# Patient Record
Sex: FEMALE | Race: WHITE | NOT HISPANIC OR LATINO | Employment: FULL TIME | URBAN - METROPOLITAN AREA
[De-identification: names, ages, dates, MRNs, and addresses within clinical notes are randomized per-mention and may not be internally consistent; named-entity substitution may affect disease eponyms.]

---

## 2023-12-15 ENCOUNTER — HOSPITAL ENCOUNTER (EMERGENCY)
Facility: HOSPITAL | Age: 58
Discharge: HOME | End: 2023-12-16
Attending: STUDENT IN AN ORGANIZED HEALTH CARE EDUCATION/TRAINING PROGRAM
Payer: COMMERCIAL

## 2023-12-15 ENCOUNTER — APPOINTMENT (OUTPATIENT)
Dept: RADIOLOGY | Facility: HOSPITAL | Age: 58
End: 2023-12-15
Payer: COMMERCIAL

## 2023-12-15 ENCOUNTER — APPOINTMENT (OUTPATIENT)
Dept: CARDIOLOGY | Facility: HOSPITAL | Age: 58
End: 2023-12-15
Payer: COMMERCIAL

## 2023-12-15 DIAGNOSIS — R00.2 HEART PALPITATIONS: ICD-10-CM

## 2023-12-15 DIAGNOSIS — I49.3 PVC (PREMATURE VENTRICULAR CONTRACTION): Primary | ICD-10-CM

## 2023-12-15 LAB
ALBUMIN SERPL BCP-MCNC: 4.4 G/DL (ref 3.4–5)
ALP SERPL-CCNC: 101 U/L (ref 33–110)
ALT SERPL W P-5'-P-CCNC: 21 U/L (ref 7–45)
ANION GAP SERPL CALC-SCNC: 12 MMOL/L (ref 10–20)
AST SERPL W P-5'-P-CCNC: 14 U/L (ref 9–39)
BASOPHILS # BLD AUTO: 0.04 X10*3/UL (ref 0–0.1)
BASOPHILS NFR BLD AUTO: 0.5 %
BILIRUB SERPL-MCNC: 0.2 MG/DL (ref 0–1.2)
BUN SERPL-MCNC: 18 MG/DL (ref 6–23)
CALCIUM SERPL-MCNC: 9.7 MG/DL (ref 8.6–10.3)
CARDIAC TROPONIN I PNL SERPL HS: 3 NG/L (ref 0–13)
CHLORIDE SERPL-SCNC: 102 MMOL/L (ref 98–107)
CO2 SERPL-SCNC: 31 MMOL/L (ref 21–32)
CREAT SERPL-MCNC: 0.8 MG/DL (ref 0.5–1.05)
EOSINOPHIL # BLD AUTO: 0.17 X10*3/UL (ref 0–0.7)
EOSINOPHIL NFR BLD AUTO: 2.2 %
ERYTHROCYTE [DISTWIDTH] IN BLOOD BY AUTOMATED COUNT: 11.9 % (ref 11.5–14.5)
GFR SERPL CREATININE-BSD FRML MDRD: 86 ML/MIN/1.73M*2
GLUCOSE SERPL-MCNC: 94 MG/DL (ref 74–99)
HCT VFR BLD AUTO: 44.7 % (ref 36–46)
HGB BLD-MCNC: 15.5 G/DL (ref 12–16)
IMM GRANULOCYTES # BLD AUTO: 0.03 X10*3/UL (ref 0–0.7)
IMM GRANULOCYTES NFR BLD AUTO: 0.4 % (ref 0–0.9)
LYMPHOCYTES # BLD AUTO: 2.29 X10*3/UL (ref 1.2–4.8)
LYMPHOCYTES NFR BLD AUTO: 29.6 %
MAGNESIUM SERPL-MCNC: 2.08 MG/DL (ref 1.6–2.4)
MCH RBC QN AUTO: 32.1 PG (ref 26–34)
MCHC RBC AUTO-ENTMCNC: 34.7 G/DL (ref 32–36)
MCV RBC AUTO: 93 FL (ref 80–100)
MONOCYTES # BLD AUTO: 0.74 X10*3/UL (ref 0.1–1)
MONOCYTES NFR BLD AUTO: 9.6 %
NEUTROPHILS # BLD AUTO: 4.46 X10*3/UL (ref 1.2–7.7)
NEUTROPHILS NFR BLD AUTO: 57.7 %
NRBC BLD-RTO: 0 /100 WBCS (ref 0–0)
PLATELET # BLD AUTO: 285 X10*3/UL (ref 150–450)
POTASSIUM SERPL-SCNC: 3.6 MMOL/L (ref 3.5–5.3)
PROT SERPL-MCNC: 7.9 G/DL (ref 6.4–8.2)
RBC # BLD AUTO: 4.83 X10*6/UL (ref 4–5.2)
SODIUM SERPL-SCNC: 141 MMOL/L (ref 136–145)
WBC # BLD AUTO: 7.7 X10*3/UL (ref 4.4–11.3)

## 2023-12-15 PROCEDURE — 84443 ASSAY THYROID STIM HORMONE: CPT | Performed by: STUDENT IN AN ORGANIZED HEALTH CARE EDUCATION/TRAINING PROGRAM

## 2023-12-15 PROCEDURE — 84484 ASSAY OF TROPONIN QUANT: CPT | Performed by: STUDENT IN AN ORGANIZED HEALTH CARE EDUCATION/TRAINING PROGRAM

## 2023-12-15 PROCEDURE — 71045 X-RAY EXAM CHEST 1 VIEW: CPT | Performed by: RADIOLOGY

## 2023-12-15 PROCEDURE — 85025 COMPLETE CBC W/AUTO DIFF WBC: CPT | Performed by: STUDENT IN AN ORGANIZED HEALTH CARE EDUCATION/TRAINING PROGRAM

## 2023-12-15 PROCEDURE — 80053 COMPREHEN METABOLIC PANEL: CPT | Performed by: STUDENT IN AN ORGANIZED HEALTH CARE EDUCATION/TRAINING PROGRAM

## 2023-12-15 PROCEDURE — 71045 X-RAY EXAM CHEST 1 VIEW: CPT | Mod: FY

## 2023-12-15 PROCEDURE — 84439 ASSAY OF FREE THYROXINE: CPT | Performed by: STUDENT IN AN ORGANIZED HEALTH CARE EDUCATION/TRAINING PROGRAM

## 2023-12-15 PROCEDURE — 83735 ASSAY OF MAGNESIUM: CPT | Performed by: STUDENT IN AN ORGANIZED HEALTH CARE EDUCATION/TRAINING PROGRAM

## 2023-12-15 PROCEDURE — 85379 FIBRIN DEGRADATION QUANT: CPT | Performed by: STUDENT IN AN ORGANIZED HEALTH CARE EDUCATION/TRAINING PROGRAM

## 2023-12-15 PROCEDURE — 99284 EMERGENCY DEPT VISIT MOD MDM: CPT | Performed by: STUDENT IN AN ORGANIZED HEALTH CARE EDUCATION/TRAINING PROGRAM

## 2023-12-15 PROCEDURE — 36415 COLL VENOUS BLD VENIPUNCTURE: CPT | Performed by: STUDENT IN AN ORGANIZED HEALTH CARE EDUCATION/TRAINING PROGRAM

## 2023-12-15 PROCEDURE — 93005 ELECTROCARDIOGRAM TRACING: CPT

## 2023-12-15 PROCEDURE — 99283 EMERGENCY DEPT VISIT LOW MDM: CPT | Mod: 25

## 2023-12-15 ASSESSMENT — COLUMBIA-SUICIDE SEVERITY RATING SCALE - C-SSRS
6. HAVE YOU EVER DONE ANYTHING, STARTED TO DO ANYTHING, OR PREPARED TO DO ANYTHING TO END YOUR LIFE?: NO
2. HAVE YOU ACTUALLY HAD ANY THOUGHTS OF KILLING YOURSELF?: NO
1. IN THE PAST MONTH, HAVE YOU WISHED YOU WERE DEAD OR WISHED YOU COULD GO TO SLEEP AND NOT WAKE UP?: NO

## 2023-12-15 ASSESSMENT — PAIN SCALES - GENERAL
PAINLEVEL_OUTOF10: 0 - NO PAIN
PAINLEVEL_OUTOF10: 0 - NO PAIN

## 2023-12-15 ASSESSMENT — LIFESTYLE VARIABLES
REASON UNABLE TO ASSESS: NO
HAVE PEOPLE ANNOYED YOU BY CRITICIZING YOUR DRINKING: NO
EVER HAD A DRINK FIRST THING IN THE MORNING TO STEADY YOUR NERVES TO GET RID OF A HANGOVER: NO
EVER FELT BAD OR GUILTY ABOUT YOUR DRINKING: NO
HAVE YOU EVER FELT YOU SHOULD CUT DOWN ON YOUR DRINKING: NO

## 2023-12-15 ASSESSMENT — PAIN - FUNCTIONAL ASSESSMENT: PAIN_FUNCTIONAL_ASSESSMENT: 0-10

## 2023-12-16 VITALS
RESPIRATION RATE: 16 BRPM | DIASTOLIC BLOOD PRESSURE: 76 MMHG | SYSTOLIC BLOOD PRESSURE: 144 MMHG | HEART RATE: 72 BPM | TEMPERATURE: 96.8 F | HEIGHT: 65 IN | OXYGEN SATURATION: 96 % | WEIGHT: 164.9 LBS | BODY MASS INDEX: 27.47 KG/M2

## 2023-12-16 LAB
CARDIAC TROPONIN I PNL SERPL HS: 4 NG/L (ref 0–13)
D DIMER PPP FEU-MCNC: <215 NG/ML FEU
T4 FREE SERPL-MCNC: 1.1 NG/DL (ref 0.61–1.12)
TSH SERPL-ACNC: 0.06 MIU/L (ref 0.44–3.98)

## 2023-12-16 PROCEDURE — 84484 ASSAY OF TROPONIN QUANT: CPT | Performed by: STUDENT IN AN ORGANIZED HEALTH CARE EDUCATION/TRAINING PROGRAM

## 2023-12-16 PROCEDURE — 36415 COLL VENOUS BLD VENIPUNCTURE: CPT | Performed by: STUDENT IN AN ORGANIZED HEALTH CARE EDUCATION/TRAINING PROGRAM

## 2023-12-16 NOTE — DISCHARGE INSTRUCTIONS
You are diagnosed with frequent PVCs please follow-up with the cardiologist as provided should you been experiencing chest pain shortness of breath syncope symptoms concerning to call 911 or return immediately.  Recommend adequate rest decrease in caffeine usage as well as alcohol.  Adequate hydration as well.

## 2023-12-16 NOTE — ED PROVIDER NOTES
EMERGENCY DEPARTMENT ENCOUNTER      Pt Name: Kitty De Leon  MRN: [id number]  Birthdate 1965  Date of evaluation: 12/15/2023  Provider: Wm Faria DO    CHIEF COMPLAINT       Chief Complaint   Patient presents with    Palpitations     57 Y/O FEMALE STATES SHE WAS OUT SHOPPING ALL DAY AND WHEN SHE GOT HOME SHE BEGAN TO FEEL HER HEART BEATING IRREGULARLY. PATIENT STATES THAT SHE HAS NOTICED THIS FEELING FOR SEVERAL MONTHS BUT NOT FOR A SUSTAINED PERIOD LIKE TONIGHT        HISTORY OF PRESENT ILLNESS    Kitty De Leon is a 58 y.o. female who presents to the emergency department with Her self for intermittent palpitations for the past few months becoming more frequent this evening.  She states that she was out shopping with her friend had 1 cup of coffee earlier this morning and 1 alcoholic beverage throughout the day noticed them becoming more frequent.  She states this was anxiety provoking and they became even more frequent.  Only medical history is hypothyroidism.  She does occasionally vape tobacco only.  Denies any further associated symptoms at this time.  Denies any recent travel by the cars or planes.  No history of coagulopathies or history of blood clots.          Nursing Notes were reviewed.    REVIEW OF SYSTEMS     CONSTITUTIONAL: Denies fever, sweats, chills.   NEURO: Denies difficulty walking, numbness, weakness, tingling, headache.   HEENT: Denies sore throat, rhinorrhea, changes in vision.   CARDIO: Endorses palpitations.  Denies chest pain.  PULM: Denies shortness of breath, cough.   GI: Denies abdominal pain, nausea, vomiting, diarrhea, constipation, melena, hematochezia.  : Denies painful urination, frequency, hematuria.   MSK: Denies recent trauma.   SKIN: Denies rash, lesions.   ENDOCRINE: Denies unexpected weight-loss.   HEME: Denies bleeding disorder.     PAST MEDICAL HISTORY     Past Medical History:   Diagnosis Date    Personal history of other diseases of the circulatory system      History of hypertension    Personal history of other endocrine, nutritional and metabolic disease     History of hypothyroidism       SURGICAL HISTORY       Past Surgical History:   Procedure Laterality Date    DILATION AND CURETTAGE OF UTERUS  08/31/2015    Dilation And Curettage    LAPAROSCOPY DIAGNOSTIC / BIOPSY / ASPIRATION / LYSIS  08/31/2015    Laparoscopy (Diagnostic)       ALLERGIES     Patient has no known allergies.    FAMILY HISTORY     No family history on file.     SOCIAL HISTORY       Social History     Socioeconomic History    Marital status: Single     Spouse name: None    Number of children: None    Years of education: None    Highest education level: None   Occupational History    None   Tobacco Use    Smoking status: None    Smokeless tobacco: None   Substance and Sexual Activity    Alcohol use: None    Drug use: None    Sexual activity: None   Other Topics Concern    None   Social History Narrative    None     Social Determinants of Health     Financial Resource Strain: Not on file   Food Insecurity: Not on file   Transportation Needs: Not on file   Physical Activity: Not on file   Stress: Not on file   Social Connections: Not on file   Intimate Partner Violence: Not on file   Housing Stability: Not on file       PHYSICAL EXAM   VS: As documented in the triage note from today's date and EMR flowsheet were reviewed.  Gen: Well developed. No acute distress. Seated in bed. Appears nontoxic.   Skin: Warm. Dry. Intact. No rashes or lesions.  Eyes: Pupils equally round and reactive to light. Clear sclera.   HENT: Atraumatic appearance. Mucosal membranes moist. No oral lesions, uvula midline, airway patent.   CV: Regular rate and irregular rhythm. S1, S2. No pedal edema. Warm extremities.  Resp: Nonlabored breathing Clear to auscultation bilaterally. No increased work of breathing.   GI: Soft and nontender. No rebound or guarding. Bowel sounds x4 present.   MSK: Symmetric muscle bulk. No joint swelling  in the extremities. Compartments are soft. Neurovascularly intact x4 extremities. Radial pulses +2 equal bilaterally.  Pedal pulses +2 equal bilateral.  Neuro: Alert. Speech fluent. Moving all extremities. No focal deficits. Gait normal.  Psych: Appropriate. K[place].    DIAGNOSTIC RESULTS     RADIOLOGY:   Non-plain film images such as CT, Ultrasound and MRI are read by the radiologist. Plain radiographic images are visualized and preliminarily interpreted by the emergency physician with the below findings: X-ray without evidence of widened mediastinum or cardiomegaly.      Interpretation per the Radiologist below, if available at the time of this note:    XR chest 1 view   Final Result   No airspace consolidation or pleural effusion.        MACRO:   None        Signed by: Rodney Kerns 12/15/2023 11:21 PM   Dictation workstation:   [id number]            ED BEDSIDE ULTRASOUND:   Performed by ED Physician - none    LABS:  Labs Reviewed   TSH WITH REFLEX TO FREE T4 IF ABNORMAL - Abnormal       Result Value    Thyroid Stimulating Hormone 0.06 (*)     Narrative:     TSH testing is performed using different testing methodology at [facility] than at other [facility] hospitals. Direct result comparisons should only be made within the same method.     COMPREHENSIVE METABOLIC PANEL - Normal    Glucose 94      Sodium 141      Potassium 3.6      Chloride 102      Bicarbonate 31      Anion Gap 12      Urea Nitrogen 18      Creatinine 0.80      eGFR 86      Calcium 9.7      Albumin 4.4      Alkaline Phosphatase 101      Total Protein 7.9      AST 14      Bilirubin, Total 0.2      ALT 21     MAGNESIUM - Normal    Magnesium 2.08     D-DIMER, VTE EXCLUSION - Normal    D-Dimer, Quantitative VTE Exclusion <215      Narrative:     The VTE Exclusion D-Dimer assay is reported in ng/mL Fibrinogen Equivalent Units (FEU).    Per 's instructions for use, a value of less than 500 ng/mL (FEU) may help to exclude DVT or PE  in outpatients when the assay is used with a clinical pretest probability assessment.(AEMR must utilize and document eCalc 'Wells Score Deep Vein Thrombosis Risk' for DVT exclusion only. Emergency Department should utilize  Guidelines for Emergency Department Use of the VTE Exclusion D-Dimer and Clinical Pretest probability assessment model for DVT or PE exclusion.)   SERIAL TROPONIN-INITIAL - Normal    Troponin I, High Sensitivity 3      Narrative:     Less than 99th percentile of normal range cutoff-  Female and children under 18 years old <14 ng/L; Male <21 ng/L: Negative  Repeat testing should be performed if clinically indicated.     Female and children under 18 years old 14-50 ng/L; Male 21-50 ng/L:  Consistent with possible cardiac damage and possible increased clinical   risk. Serial measurements may help to assess extent of myocardial damage.     >50 ng/L: Consistent with cardiac damage, increased clinical risk and  myocardial infarction. Serial measurements may help assess extent of   myocardial damage.      NOTE: Children less than 1 year old may have higher baseline troponin   levels and results should be interpreted in conjunction with the overall   clinical context.     NOTE: Troponin I testing is performed using a different   testing methodology at [facility] than at [facility]. Direct result comparisons should only   be made within the same method.   SERIAL TROPONIN, 1 HOUR - Normal    Troponin I, High Sensitivity 4      Narrative:     Less than 99th percentile of normal range cutoff-  Female and children under 18 years old <14 ng/L; Male <21 ng/L: Negative  Repeat testing should be performed if clinically indicated.     Female and children under 18 years old 14-50 ng/L; Male 21-50 ng/L:  Consistent with possible cardiac damage and possible increased clinical   risk. Serial measurements may help to assess extent of myocardial damage.     >50 ng/L: Consistent with cardiac  damage, increased clinical risk and  myocardial infarction. Serial measurements may help assess extent of   myocardial damage.      NOTE: Children less than 1 year old may have higher baseline troponin   levels and results should be interpreted in conjunction with the overall   clinical context.     NOTE: Troponin I testing is performed using a different   testing methodology at [facility] than at other   [facility]. Direct result comparisons should only   be made within the same method.   THYROXINE, FREE - Normal    Thyroxine, Free 1.10      Narrative:     Thyroxine Free testing is performed using different testing methodology at [facility] than at other [facility]. Direct result comparisons should only be made within the same method.    Biotin can cause falsely elevated free T4 results. Patients taking a Biotin dose of up to 10 mg/day should refrain from taking Biotin for 24 hours before sample collection. Patient taking a Biotin dose of >10 mg/day should consult with their physician or the laboratory before the blood draw.   TROPONIN SERIES- (INITIAL, 1 HR)    Narrative:     The following orders were created for panel order Troponin I Series, High Sensitivity (0, 1 HR).  Procedure                               Abnormality         Status                     ---------                               -----------         ------                     Troponin I, High Sensiti...[[id number]]  Normal              Final result               Troponin, High Sensitivi...[[id number]]  Normal              Final result                 Please view results for these tests on the individual orders.   CBC WITH AUTO DIFFERENTIAL    WBC 7.7      nRBC 0.0      RBC 4.83      Hemoglobin 15.5      Hematocrit 44.7      MCV 93      MCH 32.1      MCHC 34.7      RDW 11.9      Platelets 285      Neutrophils % 57.7      Immature Granulocytes %, Automated 0.4      Lymphocytes % 29.6      Monocytes % 9.6       Eosinophils % 2.2      Basophils % 0.5      Neutrophils Absolute 4.46      Immature Granulocytes Absolute, Automated 0.03      Lymphocytes Absolute 2.29      Monocytes Absolute 0.74      Eosinophils Absolute 0.17      Basophils Absolute 0.04         All other labs were within normal range or not returned as of this dictation.    EMERGENCY DEPARTMENT COURSE/MDM:   Vitals:    Vitals:    12/16/23 0000 12/16/23 0045 12/16/23 0100 12/16/23 0115   BP: 139/68  144/76    BP Location: Right arm  Right arm    Patient Position: Sitting  Sitting    Pulse: 80 78 76 72   Resp: 17 23 18 16   Temp:       TempSrc:       SpO2: 96% 98% 96% 96%   Weight:       Height:           I reviewed the patient's triage vitals and they are hypertensive recommended follow-up with primary physician for repeat checks.    Due to the above findings the following was ordered cardiac workup to include D-dimer TSH.    Workup shows no significant electrolyte derangements renal function is appropriate.  Free T4 within normal range.  D-dimer low making PE less likely.  Clinically does not fit the picture.  Cardiac troponin x 2 negative no acute injury pattern on EKG doubt atypical ACS.  She does have frequent PVCs which throughout the course of emergency department significantly improved to become infrequent.  I do suspect these may be provoked by her anxiety as well as alcohol intake among caffeinated beverages as well as somewhat poor sleep habits typically sleeping 6 hours a night.  Believe it to be multifactorial.  She is overall well-appearing and asymptomatic at this time on reevaluation.  Patient was recommended to follow-up with cardiology as soon as possible outpatient for further evaluation she is agreeable with this plan she is appreciative of care.  Patient discharged in stable condition.    ED Course as of 12/16/23 1747   Fri Dec 15, 2023   2315 Interpreted by the Emergency Department Attending: ECG revealed sinus tachycardia frequent PVCs at a  rate of 101 beats per minute with IL interval 155 , QRS of 96 , QTc of 471. No appreciable ST elevations or depressions.  No previous EKG to compare.   [MG]      ED Course User Index  [MG] Wm Faria DO         Diagnoses as of 12/16/23 1747   PVC (premature ventricular contraction)   Heart palpitations       Patient was counseled regarding labs, imaging, likely diagnosis, and plan. All questions were answered.     ------------------------------------------------------------------  Information provided by the patient  Past medical history complicating workup hypothyroidism  -----------------------------------------------------------------  ED Medications administered this visit:  Medications - No data to display    New Prescriptions from this visit:    There are no discharge medications for this patient.      Follow-up:  Aurea Camarillo MD  [id number]  [place] [facility] [id number] [phone]    Schedule an appointment as soon as possible for a visit in 2 days      [facility]  [address] [id number] [phone]  Go to   If symptoms worsen    Ender Dudley MD  [address]  [place] [address]  [phone]    Schedule an appointment as soon as possible for a visit in 1 day          Final Impression:   1. PVC (premature ventricular contraction)    2. Heart palpitations          Wm Faria DO    (Please note that portions of this note were completed with a voice recognition program.  Efforts were made to edit the dictations but occasionally words are mis-transcribed.)     Wm Faria DO  12/16/23 2068

## 2023-12-27 PROBLEM — H52.03 HYPEROPIA OF BOTH EYES WITH ASTIGMATISM AND PRESBYOPIA: Status: ACTIVE | Noted: 2023-12-27

## 2023-12-27 PROBLEM — N89.8 VAGINAL IRRITATION: Status: ACTIVE | Noted: 2023-12-27

## 2023-12-27 PROBLEM — N92.6 IRREGULAR MENSES: Status: ACTIVE | Noted: 2023-12-27

## 2023-12-27 PROBLEM — H52.203 HYPEROPIA OF BOTH EYES WITH ASTIGMATISM AND PRESBYOPIA: Status: ACTIVE | Noted: 2023-12-27

## 2023-12-27 PROBLEM — H52.4 HYPEROPIA OF BOTH EYES WITH ASTIGMATISM AND PRESBYOPIA: Status: ACTIVE | Noted: 2023-12-27

## 2023-12-27 PROBLEM — N95.0 POST-MENOPAUSAL BLEEDING: Status: ACTIVE | Noted: 2023-12-27

## 2023-12-27 PROBLEM — R30.0 DYSURIA: Status: ACTIVE | Noted: 2023-12-27

## 2023-12-27 PROBLEM — B37.31 YEAST INFECTION OF THE VAGINA: Status: ACTIVE | Noted: 2023-12-27

## 2024-01-08 ENCOUNTER — OFFICE VISIT (OUTPATIENT)
Dept: CARDIOLOGY | Facility: CLINIC | Age: 59
End: 2024-01-08
Payer: COMMERCIAL

## 2024-01-08 VITALS
OXYGEN SATURATION: 99 % | WEIGHT: 167 LBS | BODY MASS INDEX: 26.84 KG/M2 | SYSTOLIC BLOOD PRESSURE: 130 MMHG | HEART RATE: 79 BPM | DIASTOLIC BLOOD PRESSURE: 74 MMHG | HEIGHT: 66 IN

## 2024-01-08 DIAGNOSIS — R00.2 PALPITATIONS: Primary | ICD-10-CM

## 2024-01-08 DIAGNOSIS — E78.2 MIXED HYPERLIPIDEMIA: ICD-10-CM

## 2024-01-08 PROCEDURE — 99204 OFFICE O/P NEW MOD 45 MIN: CPT | Performed by: INTERNAL MEDICINE

## 2024-01-08 RX ORDER — LEVOTHYROXINE SODIUM 125 UG/1
125 TABLET ORAL
COMMUNITY
Start: 2014-05-09

## 2024-01-08 NOTE — ASSESSMENT & PLAN NOTE
1.  Palpitations: The palpitations are secondary to isolated PVCs.  An extended Holter monitor will be obtained to assess the PVC burden.  We discussed the option of initiating pharmacologic therapy for symptomatic relief.  Catheter ablation can also be considered if there is a high PVC burden.    2.  Hyperlipidemia: The patient reports a very strong family history of premature CAD and she also reports having very high cholesterol dating back to her 20s.  We do not have a lipid panel available for review at this time.  A coronary artery calcium score is recommended for further risk stratification.  I have also recommended that she obtain a fasting lipid panel.  Follow-up after testing is completed and further treatment recommendation will be made at that time.

## 2024-01-08 NOTE — PROGRESS NOTES
"Reason For Consult    Palpitations    History Of Present Illness    This is a 58-year-old female with hyperlipidemia.  She is being seen today in consultation for evaluation of palpitations.  The patient began noticing palpitations approximately 1 year ago, and this was described as a skipped beat sensation in her chest.  She would feel occasional fluttering episodes and now they are occurring almost on a daily basis.  She had an emergency department visit in December because of these palpitations and was identified to have PVCs on the EKG.  Emergency department records reviewed today.    Past medical history:    Hyperlipidemia  Hypothyroidism, with recent low TSH in emergency department    Past surgical history: Denies any major surgical procedures    Family history:    Father had CABG surgery in his 40s and  at 51 years of age from cancer  Mother had CABG surgery around 70 years of age and she is alive at 83 years of age  Brother had a myocardial infarction at 55 years of age with stent placement     Review of systems  Other review of systems negative other than as outlined in HPI     Social History  She has no history on file for tobacco use, alcohol use, and drug use.    Family History  No family history on file.     Allergies  Egg and Penicillins    Medications  Current Outpatient Medications   Medication Instructions    levothyroxine (SYNTHROID, LEVOXYL) 125 mcg, oral, Daily before breakfast         Vitals      12/15/2023    11:15 PM 12/15/2023    11:45 PM 2023    12:00 AM 2023    12:45 AM 2023     1:00 AM 2023     1:15 AM 2024     8:32 AM   Vitals   Systolic 171  139  144  130   Diastolic 76  68  76  74   Heart Rate 85 80 80 78 76 72 79   Resp 18 20 17 23 18 16    Height (in)       1.664 m (5' 5.5\")   Weight (lb)       167   BMI       27.37 kg/m2   BSA (m2)       1.87 m2   Visit Report       Report        Physical Exam    General Appearance:  Alert, oriented, no distress  Skin: "  Warm and dry  Head and Neck:  No elevation of JVP, no carotid bruits  Cardiac Exam:  Rhythm is regular, S1 and S2 are normal, no murmur S3 or S4  Lungs:  Clear to auscultation  Extremities:  no edema  Neurologic:  No focal deficits  Psychiatric:  Appropriate mood and behavior       Test Results  I have reviewed the following diagnostic studies and my interpretation is as follows:    EKG: Sinus rhythm with occasional PVCs     Assessment/Plan  Problem List Items Addressed This Visit             ICD-10-CM    Palpitations - Primary R00.2     1.  Palpitations: The palpitations are secondary to isolated PVCs.  An extended Holter monitor will be obtained to assess the PVC burden.  We discussed the option of initiating pharmacologic therapy for symptomatic relief.  Catheter ablation can also be considered if there is a high PVC burden.    2.  Hyperlipidemia: The patient reports a very strong family history of premature CAD and she also reports having very high cholesterol dating back to her 20s.  We do not have a lipid panel available for review at this time.  A coronary artery calcium score is recommended for further risk stratification.  I have also recommended that she obtain a fasting lipid panel.  Follow-up after testing is completed and further treatment recommendation will be made at that time.         Relevant Orders    Holter or Event Cardiac Monitor    Mixed hyperlipidemia E78.2    Relevant Orders    CT cardiac scoring wo IV contrast    Lipid panel             James C Ramicone, DO

## 2024-01-09 LAB
ATRIAL RATE: 101 BPM
P AXIS: 71 DEGREES
PR INTERVAL: 155 MS
Q ONSET: 249 MS
QRS COUNT: 17 BEATS
QRS DURATION: 96 MS
QT INTERVAL: 363 MS
QTC CALCULATION(BAZETT): 471 MS
QTC FREDERICIA: 431 MS
R AXIS: 53 DEGREES
T AXIS: 41 DEGREES
T OFFSET: 431 MS
VENTRICULAR RATE: 101 BPM

## 2024-01-12 ENCOUNTER — ANCILLARY PROCEDURE (OUTPATIENT)
Dept: CARDIOLOGY | Facility: CLINIC | Age: 59
End: 2024-01-12
Payer: COMMERCIAL

## 2024-01-12 DIAGNOSIS — R00.2 PALPITATIONS: ICD-10-CM

## 2024-01-12 PROCEDURE — 93244 EXT ECG>48HR<7D REV&INTERPJ: CPT | Performed by: INTERNAL MEDICINE

## 2024-02-12 ENCOUNTER — APPOINTMENT (OUTPATIENT)
Dept: RADIOLOGY | Facility: CLINIC | Age: 59
End: 2024-02-12
Payer: COMMERCIAL

## 2024-02-22 PROBLEM — I10 ESSENTIAL (PRIMARY) HYPERTENSION: Status: ACTIVE | Noted: 2019-11-26

## 2024-02-22 PROBLEM — I49.3 MULTIPLE PREMATURE VENTRICULAR COMPLEXES: Status: ACTIVE | Noted: 2024-02-22

## 2024-02-26 ENCOUNTER — APPOINTMENT (OUTPATIENT)
Dept: CARDIOLOGY | Facility: CLINIC | Age: 59
End: 2024-02-26
Payer: COMMERCIAL

## 2024-02-28 ENCOUNTER — APPOINTMENT (OUTPATIENT)
Dept: CARDIOLOGY | Facility: CLINIC | Age: 59
End: 2024-02-28
Payer: COMMERCIAL

## 2024-03-05 ENCOUNTER — APPOINTMENT (OUTPATIENT)
Dept: PRIMARY CARE | Facility: CLINIC | Age: 59
End: 2024-03-05
Payer: COMMERCIAL

## 2024-03-05 ENCOUNTER — APPOINTMENT (OUTPATIENT)
Dept: RADIOLOGY | Facility: CLINIC | Age: 59
End: 2024-03-05
Payer: COMMERCIAL

## 2024-03-06 ENCOUNTER — LAB (OUTPATIENT)
Dept: LAB | Facility: LAB | Age: 59
End: 2024-03-06
Payer: COMMERCIAL

## 2024-03-06 ENCOUNTER — TELEMEDICINE (OUTPATIENT)
Dept: PRIMARY CARE | Facility: CLINIC | Age: 59
End: 2024-03-06
Payer: COMMERCIAL

## 2024-03-06 DIAGNOSIS — E78.2 MIXED HYPERLIPIDEMIA: ICD-10-CM

## 2024-03-06 DIAGNOSIS — R30.0 DYSURIA: ICD-10-CM

## 2024-03-06 DIAGNOSIS — R30.0 DYSURIA: Primary | ICD-10-CM

## 2024-03-06 LAB
APPEARANCE UR: CLEAR
BACTERIA #/AREA URNS AUTO: ABNORMAL /HPF
BILIRUB UR STRIP.AUTO-MCNC: NEGATIVE MG/DL
CHOLEST SERPL-MCNC: 274 MG/DL (ref 0–199)
CHOLESTEROL/HDL RATIO: 3.9
COLOR UR: ABNORMAL
GLUCOSE UR STRIP.AUTO-MCNC: NEGATIVE MG/DL
HDLC SERPL-MCNC: 70.1 MG/DL
KETONES UR STRIP.AUTO-MCNC: NEGATIVE MG/DL
LDLC SERPL CALC-MCNC: 186 MG/DL
LEUKOCYTE ESTERASE UR QL STRIP.AUTO: NEGATIVE
MUCOUS THREADS #/AREA URNS AUTO: ABNORMAL /LPF
NITRITE UR QL STRIP.AUTO: NEGATIVE
NON HDL CHOLESTEROL: 204 MG/DL (ref 0–149)
PH UR STRIP.AUTO: 5 [PH]
PROT UR STRIP.AUTO-MCNC: NEGATIVE MG/DL
RBC # UR STRIP.AUTO: ABNORMAL /UL
RBC #/AREA URNS AUTO: ABNORMAL /HPF
SP GR UR STRIP.AUTO: 1.01
TRIGL SERPL-MCNC: 88 MG/DL (ref 0–149)
UROBILINOGEN UR STRIP.AUTO-MCNC: <2 MG/DL
VLDL: 18 MG/DL (ref 0–40)
WBC #/AREA URNS AUTO: ABNORMAL /HPF

## 2024-03-06 PROCEDURE — 81001 URINALYSIS AUTO W/SCOPE: CPT

## 2024-03-06 PROCEDURE — 36415 COLL VENOUS BLD VENIPUNCTURE: CPT

## 2024-03-06 PROCEDURE — 87086 URINE CULTURE/COLONY COUNT: CPT

## 2024-03-06 PROCEDURE — 99203 OFFICE O/P NEW LOW 30 MIN: CPT

## 2024-03-06 PROCEDURE — 80061 LIPID PANEL: CPT

## 2024-03-06 ASSESSMENT — ENCOUNTER SYMPTOMS
NAUSEA: 0
FREQUENCY: 1
HEMATURIA: 0
SWEATS: 0
CHILLS: 1
VOMITING: 0

## 2024-03-06 NOTE — PROGRESS NOTES
This visit was completed via video conference. All issues as below were discussed and addressed but no physical exam was performed. If it was felt that the patient should be evaluated in clinic than they were directed there. The patient verbally consented to the visit.    Subjective   Patient ID: Kimmy De Leon is a 59 y.o. female who presents for UTI.    UTI   This is a new problem. The current episode started yesterday. The problem occurs every urination. The problem has been gradually worsening. The quality of the pain is described as aching and burning. The pain is at a severity of 5/10. The pain is moderate. The maximum temperature recorded prior to her arrival was 100 - 100.9 F. She is Sexually active. There is No history of pyelonephritis. Associated symptoms include chills, frequency and urgency. Pertinent negatives include no hematuria, hesitancy, nausea, possible pregnancy, sweats or vomiting. She has tried increased fluids for the symptoms. The treatment provided no relief. There is no history of recurrent UTIs or a single kidney.        Review of Systems   Constitutional:  Positive for chills.   Gastrointestinal:  Negative for nausea and vomiting.   Genitourinary:  Positive for frequency and urgency. Negative for hematuria and hesitancy.       Objective   LMP  (LMP Unknown)     Physical Exam pt seen from her home to be in no acute distress    Assessment/Plan   Problem List Items Addressed This Visit             ICD-10-CM    Dysuria - Primary R30.0    Relevant Orders    Urinalysis with Reflex Microscopic    Urine Culture

## 2024-03-07 LAB — BACTERIA UR CULT: NORMAL
